# Patient Record
Sex: FEMALE | Race: WHITE
[De-identification: names, ages, dates, MRNs, and addresses within clinical notes are randomized per-mention and may not be internally consistent; named-entity substitution may affect disease eponyms.]

---

## 2019-02-14 ENCOUNTER — HOSPITAL ENCOUNTER (OUTPATIENT)
Dept: HOSPITAL 47 - PNWHC3 | Age: 39
Discharge: HOME | End: 2019-02-14
Attending: ANESTHESIOLOGY
Payer: COMMERCIAL

## 2019-02-14 VITALS — SYSTOLIC BLOOD PRESSURE: 103 MMHG | HEART RATE: 74 BPM | RESPIRATION RATE: 16 BRPM | DIASTOLIC BLOOD PRESSURE: 72 MMHG

## 2019-02-14 VITALS — BODY MASS INDEX: 26.5 KG/M2

## 2019-02-14 DIAGNOSIS — M79.602: ICD-10-CM

## 2019-02-14 DIAGNOSIS — M25.512: ICD-10-CM

## 2019-02-14 DIAGNOSIS — F17.210: ICD-10-CM

## 2019-02-14 DIAGNOSIS — M25.511: ICD-10-CM

## 2019-02-14 DIAGNOSIS — M79.601: ICD-10-CM

## 2019-02-14 DIAGNOSIS — Z88.6: ICD-10-CM

## 2019-02-14 DIAGNOSIS — M50.223: Primary | ICD-10-CM

## 2019-02-14 PROCEDURE — 99201: CPT

## 2019-02-14 NOTE — P.CONS
History of Present Illness





- Reason for Consult


Consult date: 02/14/19





- Chief Complaint


Lower neck and upper thoracic spine pain, bilateral shoulder pain





- History of Present Illness


This is a 30-year-old lady with history of increasing upper back and lower neck 

pain with radiation to both arms down to the mid forearm.  The patient denies 

any numbness or tingling in the upper extremities.  She denies any bowel or 

bladder dysfunction or any weakness in the upper or lower extremities.  The 

patient's pain gets worse after prolonged day of Norco and she does work as as 

a  in a restaurant.  The patient had an MRI on the cervical spine which 

showed right central disc extrusion with 5 mm of cranial migration and mild 

mass effect on the right ventral spinal cord right lateral recess AT the C6-C7 

level.


She also had an MRI on the right shoulder which showed supraspinatus tendinosis.


The pain affects the patient's quality of sleep.  She denies any weight loss.  

The patient had seen a neurosurgeon who recommended cervical epidural steroid 

injection at this point.


The patient has been going to a chiropractic but never tried physical therapy 

previously.  She used to take Percocet for her pain and last time was in 

December 2018.


The patient has a history of Crohn's disease and she smokes half a pack to 1 

pack of cigarettes a day.  She denies taking any anticoagulants.








Past Medical History


Past Medical History: GERD/Reflux


Additional Past Medical History / Comment(s): CROHN'S DISEASE.  HERNIATED DISC 

IN NECK AND BACK.  "LEAKY HEART VALVE" PER PT


History of Any Multi-Drug Resistant Organisms: None Reported


Past Surgical History: Adenoidectomy, Bowel Resection, Hysterectomy, 

Tonsillectomy, Uterine Ablation


Additional Past Surgical History / Comment(s): COLONOSCOPY


Past Anesthesia/Blood Transfusion Reactions: Previous Problems w/ Anesthesia


Additional Past Anesthesia/Blood Transfusion Reaction / Comm: WOKE UP DURING 

COLONOSCOPY


Past Psychological History: No Psychological Hx Reported


Smoking Status: Current every day smoker


Past Alcohol Use History: Occasional


Additional Past Alcohol Use History / Comment(s): SMOKES < 1PPD SINCE AGE 16


Past Drug Use History: None Reported





- Past Family History


  ** Father


Family Medical History: Cancer





Medications and Allergies


 Home Medications











 Medication  Instructions  Recorded  Confirmed  Type


 


Ergocalciferol [Vitamin D2 50,000 units PO TH 02/13/19 02/14/19 History





(DRISDOL)]    


 


Estradiol [Estradiol 0.06 MG Patch] 1 patch TOPICAL TH 02/13/19 02/14/19 History


 


Famotidine [Pepcid AC] 10 mg PO DAILY 02/13/19 02/14/19 History


 


Ondansetron [Zofran] 4 mg PO Q8HR PRN 02/13/19 02/14/19 History


 


Promethazine [Phenergan] 25 mg PO DAILY PRN 02/13/19 02/14/19 History











 Allergies











Allergy/AdvReac Type Severity Reaction Status Date / Time


 


adhesive Allergy  Rash/Hives Verified 02/14/19 11:44


 


NSAIDS (Non-Steroidal AdvReac  HX ULCERS Verified 02/14/19 11:44





Anti-Inflamma     


 


MRI DYE Allergy  Anaphylaxis Uncoded 02/14/19 11:44














Physical Exam


Vitals: 


 Vital Signs











  Pulse Resp BP Pulse Ox


 


 02/14/19 11:45  74  16  103/72  97














- EENT


Eyes: PERRLA





- Respiratory


Respiratory: bilateral: CTA





- Cardiovascular


Rhythm: regular





- Integumentary


Integumentary: no calor, no cellulitis, no cyanotic, no decreased turgor, no 

flushed, no jaundiced, no normal, no normal turgor, no pale, no rash, no ulcer





- Neurologic





Neuro exam of the upper extremities showed normal and symmetrical muscle 

strength.


Normal biceps reflexes bilaterally


Absent triceps reflex bilaterally


Significant tenderness around the left trapezius muscle.  Positive tenderness 

in the cervical paravertebral musculature bilaterally.


She has normal range of motion of the shoulder joints bilaterally however with 

some pain.


Normal range of motion of the cervical spine.


Neurologic: CNII-XII intact





- Psychiatric


Psychiatric: A&O x's 3, appropriate affect, intact judgment & insight





Assessment and Plan


Plan: 


This is a 38-year-old lady with cervical disc herniation at the C6 7 level and 

bilateral shoulder and arms pain however without paresthesia in the upper 

extremities.  The patient was referred to our clinic for a trial of cervical 

epidural steroid injection under fluoroscopic guidance.  The patient denies 

taking any anticoagulants and she has no ALLERGIES to IV contrast dye except 

for MRI gadolinium.


We will plan on doing cervical epidural steroid injection under fluoroscopic 

guidance.  The procedure was explained to the patient and her questions were 

answered.  


I offered the patient to postpone the injection so she can try physical therapy 

first but she prefers to go with the injection first .





I thank you for the referral.

## 2021-04-15 ENCOUNTER — HOSPITAL ENCOUNTER (EMERGENCY)
Dept: HOSPITAL 47 - EC | Age: 41
Discharge: HOME | End: 2021-04-15
Payer: COMMERCIAL

## 2021-04-15 VITALS — RESPIRATION RATE: 18 BRPM | TEMPERATURE: 97.7 F

## 2021-04-15 VITALS — SYSTOLIC BLOOD PRESSURE: 95 MMHG | HEART RATE: 52 BPM | DIASTOLIC BLOOD PRESSURE: 60 MMHG

## 2021-04-15 DIAGNOSIS — K21.9: ICD-10-CM

## 2021-04-15 DIAGNOSIS — T88.1XXA: Primary | ICD-10-CM

## 2021-04-15 DIAGNOSIS — Z90.710: ICD-10-CM

## 2021-04-15 DIAGNOSIS — Z90.09: ICD-10-CM

## 2021-04-15 DIAGNOSIS — R25.1: ICD-10-CM

## 2021-04-15 DIAGNOSIS — F17.200: ICD-10-CM

## 2021-04-15 PROCEDURE — 99283 EMERGENCY DEPT VISIT LOW MDM: CPT

## 2021-04-15 PROCEDURE — 96374 THER/PROPH/DIAG INJ IV PUSH: CPT

## 2021-04-15 PROCEDURE — 96375 TX/PRO/DX INJ NEW DRUG ADDON: CPT

## 2021-04-15 NOTE — ED
General Adult HPI





- General


Chief complaint: Allergic Reaction


Stated complaint: Tremors


Time Seen by Provider: 04/15/21 14:33


Source: patient, EMS, RN notes reviewed


Mode of arrival: EMS





- History of Present Illness


Initial comments: 





41-year-old female with a past medical history of GERD, Crohn's disease presents

to the emergency room for a chief complaint of vaccine reaction.  Patient 

reports that she had the first Pfizer covid vaccine about an hour and a half 

ago.  Patient reports that about 2 minutes afterward she started to feel 

nauseous.  States several minutes later she started to have tremors in her whole

body.  States that when she clenches she can get them to stop.  States they have

improved significantly and she is no longer having tremors in her hands.  Zulema

ent has not taken anything for this.  Patient denies any sling of the left sore 

throat.Patient has no other complaints at this time including shortness of 

breath, chest pain, abdominal pain, nausea or vomiting, headache, or visual 

changes.





- Related Data


                                Home Medications











 Medication  Instructions  Recorded  Confirmed


 


Ergocalciferol [Vitamin D2 50,000 units PO TH 02/13/19 02/14/19





(DRISDOL)]   


 


Famotidine [Pepcid AC] 10 mg PO DAILY 02/13/19 02/14/19


 


Ondansetron [Zofran] 4 mg PO Q8HR PRN 02/13/19 02/14/19


 


Promethazine [Phenergan] 25 mg PO DAILY PRN 02/13/19 02/14/19


 


estradioL [Estradiol 0.06 MG Patch] 1 patch TOPICAL TH 02/13/19 02/14/19











                                    Allergies











Allergy/AdvReac Type Severity Reaction Status Date / Time


 


adhesive Allergy  Rash/Hives Verified 02/14/19 11:44


 


NSAIDS (Non-Steroidal AdvReac  HX ULCERS Verified 02/14/19 11:44





Anti-Inflamma     


 


MRI DYE Allergy  Anaphylaxis Uncoded 02/14/19 11:44














Review of Systems


ROS Statement: 


Those systems with pertinent positive or pertinent negative responses have been 

documented in the HPI.





ROS Other: All systems not noted in ROS Statement are negative.





Past Medical History


Past Medical History: GERD/Reflux


Additional Past Medical History / Comment(s): CROHN'S DISEASE.  HERNIATED DISC 

IN NECK AND BACK.  "LEAKY HEART VALVE" PER PT


History of Any Multi-Drug Resistant Organisms: None Reported


Past Surgical History: Adenoidectomy, Bowel Resection, Hysterectomy, 

Tonsillectomy, Uterine Ablation


Additional Past Surgical History / Comment(s): COLONOSCOPY


Past Anesthesia/Blood Transfusion Reactions: Previous Problems w/ Anesthesia


Additional Past Anesthesia/Blood Transfusion Reaction / Comment(s): WOKE UP 

DURING COLONOSCOPY


Past Psychological History: No Psychological Hx Reported


Smoking Status: Current every day smoker


Past Alcohol Use History: Occasional


Past Drug Use History: None Reported, Marijuana





- Past Family History


  ** Father


Family Medical History: Cancer





General Exam


General appearance: alert, in no apparent distress


Head exam: Present: atraumatic, normocephalic, normal inspection


Eye exam: Present: normal appearance, PERRL, EOMI.  Absent: scleral icterus, 

conjunctival injection, periorbital swelling


ENT exam: Present: normal exam


Neck exam: Present: normal inspection, full ROM.  Absent: tenderness, 

meningismus, lymphadenopathy


Respiratory exam: Present: normal lung sounds bilaterally.  Absent: respiratory 

distress, wheezes, rales, rhonchi, stridor


Cardiovascular Exam: Present: regular rate, normal rhythm, normal heart sounds. 

Absent: systolic murmur, diastolic murmur, rubs, gallop, clicks


GI/Abdominal exam: Present: soft, normal bowel sounds.  Absent: distended, 

tenderness, guarding, rebound, rigid


Extremities exam: Present: other (coarse tremors of trunk)





Course


                                   Vital Signs











  04/15/21





  14:22


 


Temperature 97.7 F


 


Pulse Rate 74


 


Respiratory 18





Rate 


 


Blood Pressure 124/79


 


O2 Sat by Pulse 99





Oximetry 














Medical Decision Making





- Medical Decision Making





HPI and physical exam as documented.  Vitals are stable.  Patient does have some

tremors noted upon arrival.  She was given Benadryl and Pepcid.  These did 

completely resolve.  Patient stable for discharge.  I did discuss following up 

with her very care provider to discuss whether she should get the second 

immunization.  If she has any worsening symptoms she will return here.





Disposition


Clinical Impression: 


 Vaccine reaction, Coarse tremors





Disposition: HOME SELF-CARE


Condition: Good


Instructions (If sedation given, give patient instructions):  Tremors (ED)


Additional Instructions: 


Please follow up with primary care in 1-2 days.  Return to the emergency room 

for any worsening symptoms.  If you develop any signs of ALLERGIC reaction such 

as one of the lips tongue or throat take Benadryl and return to the ER 

immediately.


Is patient prescribed a controlled substance at d/c from ED?: No


Referrals: 


Gerhard Laird MD [Primary Care Provider] - 1-2 days


Time of Disposition: 15:47

## 2024-09-20 ENCOUNTER — HOSPITAL ENCOUNTER (OUTPATIENT)
Dept: HOSPITAL 47 - ORWHC2ENDO | Age: 44
Discharge: HOME | End: 2024-09-20
Attending: INTERNAL MEDICINE
Payer: COMMERCIAL

## 2024-09-20 VITALS — TEMPERATURE: 97 F

## 2024-09-20 VITALS — HEART RATE: 70 BPM | SYSTOLIC BLOOD PRESSURE: 104 MMHG | DIASTOLIC BLOOD PRESSURE: 67 MMHG

## 2024-09-20 VITALS — RESPIRATION RATE: 18 BRPM

## 2024-09-20 PROCEDURE — 88305 TISSUE EXAM BY PATHOLOGIST: CPT

## 2024-09-20 PROCEDURE — 45380 COLONOSCOPY AND BIOPSY: CPT

## 2024-09-20 RX ADMIN — POTASSIUM CHLORIDE ONE MLS: 14.9 INJECTION, SOLUTION INTRAVENOUS at 11:47

## 2024-09-20 RX ADMIN — POTASSIUM CHLORIDE SCH MLS/HR: 14.9 INJECTION, SOLUTION INTRAVENOUS at 11:48

## 2024-09-20 NOTE — P.PCN
Date of Procedure: 09/20/24


Procedure(s) Performed: 


BRIEF HISTORY: Patient is a 44-year-old pleasant white female scheduled for an 

elective colonoscopy as a part of evaluation for Crohn's a disease that was 

diagnosed in 2015.  She is s/p terminal ileum resection in 2016.  She has been 

have been on Humira in the past but recently has been on Entyvio since 2020.  

Only recently had a flareup and was treated with prednisone and is scheduled for

a colonoscopy to evaluate further.





PROCEDURE PERFORMED: Colonoscopy. 





PREOPERATIVE DIAGNOSIS: Longstanding history of Crohn's disease recent flareup. 





IV sedation per Anesthesia. 





PROCEDURE: After informed consent was obtained, the patient, was brought into 

the endoscopy unit. IV sedation was administered by Anesthesia under continuous 

monitoring.  Digital rectal examination was normal. Initially the Olympus CF-160

flexible video colonoscope was then inserted in the rectum, gradually advanced 

into the right colon without any difficulty. Careful examination was performed 

as the scope was gradually being withdrawn.  The ileocolic anastomosis 

visualized that appeared widely patent and normal.  The scope was advanced to 

the distal ileum at 20 cm visualized and appeared normal.  Mucosa of the 

ascending colon, transverse colon, descending colon, sigmoid colon, and rectum 

appeared normal. Retroflexion was performed in the rectum and no lesions were 

seen biopsies were done from the ileocolic anastomosis and in the transverse 

colon. The patient tolerated the procedure well. 





IMPRESSION: Normal-appearing colon from rectum to ileocolic anastomosis in the 

right colon and normal distal ileum with no evidence of active Crohn's disease.





RECOMMENDATIONS:  Findings of this examination were discussed with the patient 

as well as her family.  She was advised to follow-up with the biopsy results.  

She will be seen in the office in 2 weeks..

## 2025-07-23 ENCOUNTER — HOSPITAL ENCOUNTER (OUTPATIENT)
Dept: HOSPITAL 47 - ORWHC2ENDO | Age: 45
Discharge: HOME | End: 2025-07-23
Attending: INTERNAL MEDICINE
Payer: COMMERCIAL

## 2025-07-23 VITALS — DIASTOLIC BLOOD PRESSURE: 65 MMHG | SYSTOLIC BLOOD PRESSURE: 109 MMHG | HEART RATE: 68 BPM

## 2025-07-23 VITALS — TEMPERATURE: 97.8 F | RESPIRATION RATE: 16 BRPM

## 2025-07-23 DIAGNOSIS — Z79.899: ICD-10-CM

## 2025-07-23 DIAGNOSIS — K50.90: ICD-10-CM

## 2025-07-23 DIAGNOSIS — F17.290: ICD-10-CM

## 2025-07-23 DIAGNOSIS — Z88.6: ICD-10-CM

## 2025-07-23 DIAGNOSIS — Z88.7: ICD-10-CM

## 2025-07-23 DIAGNOSIS — K44.9: ICD-10-CM

## 2025-07-23 DIAGNOSIS — K29.50: Primary | ICD-10-CM

## 2025-07-23 DIAGNOSIS — K21.9: ICD-10-CM

## 2025-07-23 DIAGNOSIS — Z88.8: ICD-10-CM

## 2025-07-23 DIAGNOSIS — K20.90: ICD-10-CM

## 2025-07-23 PROCEDURE — 43239 EGD BIOPSY SINGLE/MULTIPLE: CPT

## 2025-07-23 PROCEDURE — 88305 TISSUE EXAM BY PATHOLOGIST: CPT

## 2025-07-23 RX ADMIN — ONDANSETRON STA MG: 2 INJECTION INTRAMUSCULAR; INTRAVENOUS at 14:01

## 2025-07-23 RX ADMIN — POTASSIUM CHLORIDE SCH MLS/HR: 14.9 INJECTION, SOLUTION INTRAVENOUS at 14:01

## 2025-07-23 RX ADMIN — POTASSIUM CHLORIDE ONE MLS: 14.9 INJECTION, SOLUTION INTRAVENOUS at 13:43
